# Patient Record
Sex: FEMALE | Race: WHITE | URBAN - NONMETROPOLITAN AREA
[De-identification: names, ages, dates, MRNs, and addresses within clinical notes are randomized per-mention and may not be internally consistent; named-entity substitution may affect disease eponyms.]

---

## 2021-02-15 ENCOUNTER — IMMUNIZATIONS (OUTPATIENT)
Dept: FAMILY MEDICINE CLINIC | Facility: HOSPITAL | Age: 86
End: 2021-02-15

## 2021-02-15 DIAGNOSIS — Z23 ENCOUNTER FOR IMMUNIZATION: Primary | ICD-10-CM

## 2021-02-15 PROCEDURE — 91301 SARS-COV-2 / COVID-19 MRNA VACCINE (MODERNA) 100 MCG: CPT

## 2021-02-15 PROCEDURE — 0011A SARS-COV-2 / COVID-19 MRNA VACCINE (MODERNA) 100 MCG: CPT

## 2021-03-15 ENCOUNTER — IMMUNIZATIONS (OUTPATIENT)
Dept: FAMILY MEDICINE CLINIC | Facility: HOSPITAL | Age: 86
End: 2021-03-15

## 2021-03-15 DIAGNOSIS — Z23 ENCOUNTER FOR IMMUNIZATION: Primary | ICD-10-CM

## 2021-03-15 PROCEDURE — 91301 SARS-COV-2 / COVID-19 MRNA VACCINE (MODERNA) 100 MCG: CPT

## 2021-03-15 PROCEDURE — 0012A SARS-COV-2 / COVID-19 MRNA VACCINE (MODERNA) 100 MCG: CPT

## 2023-04-17 ENCOUNTER — HOSPITAL ENCOUNTER (EMERGENCY)
Facility: HOSPITAL | Age: 88
Discharge: HOME | End: 2023-04-17
Attending: EMERGENCY MEDICINE | Admitting: EMERGENCY MEDICINE

## 2023-04-17 ENCOUNTER — APPOINTMENT (EMERGENCY)
Dept: CT IMAGING | Facility: HOSPITAL | Age: 88
End: 2023-04-17

## 2023-04-17 VITALS
BODY MASS INDEX: 35.05 KG/M2 | HEART RATE: 59 BPM | RESPIRATION RATE: 17 BRPM | OXYGEN SATURATION: 96 % | HEIGHT: 58 IN | WEIGHT: 167 LBS | SYSTOLIC BLOOD PRESSURE: 184 MMHG | DIASTOLIC BLOOD PRESSURE: 77 MMHG | TEMPERATURE: 98.1 F

## 2023-04-17 DIAGNOSIS — N39.0 UTI (URINARY TRACT INFECTION): ICD-10-CM

## 2023-04-17 DIAGNOSIS — R05.9 COUGH: Primary | ICD-10-CM

## 2023-04-17 LAB
ALBUMIN SERPL BCP-MCNC: 4.4 G/DL (ref 3.5–5)
ALP SERPL-CCNC: 85 U/L (ref 34–104)
ALT SERPL W P-5'-P-CCNC: 13 U/L (ref 7–52)
ANION GAP SERPL CALCULATED.3IONS-SCNC: 9 MMOL/L (ref 4–13)
AST SERPL W P-5'-P-CCNC: 20 U/L (ref 13–39)
BACTERIA UR QL AUTO: ABNORMAL /HPF
BASOPHILS # BLD AUTO: 0.03 THOUSANDS/ΜL (ref 0–0.1)
BASOPHILS NFR BLD AUTO: 0 % (ref 0–1)
BILIRUB SERPL-MCNC: 0.33 MG/DL (ref 0.2–1)
BILIRUB UR QL STRIP: NEGATIVE
BNP SERPL-MCNC: 181 PG/ML (ref 0–100)
BUN SERPL-MCNC: 34 MG/DL (ref 5–25)
CALCIUM SERPL-MCNC: 9.8 MG/DL (ref 8.4–10.2)
CARDIAC TROPONIN I PNL SERPL HS: 18 NG/L
CHLORIDE SERPL-SCNC: 104 MMOL/L (ref 96–108)
CLARITY UR: CLEAR
CO2 SERPL-SCNC: 27 MMOL/L (ref 21–32)
COLOR UR: YELLOW
CREAT SERPL-MCNC: 1.58 MG/DL (ref 0.6–1.3)
EOSINOPHIL # BLD AUTO: 0.4 THOUSAND/ΜL (ref 0–0.61)
EOSINOPHIL NFR BLD AUTO: 5 % (ref 0–6)
ERYTHROCYTE [DISTWIDTH] IN BLOOD BY AUTOMATED COUNT: 14.9 % (ref 11.6–15.1)
FLUAV RNA RESP QL NAA+PROBE: NEGATIVE
FLUBV RNA RESP QL NAA+PROBE: NEGATIVE
GFR SERPL CREATININE-BSD FRML MDRD: 28 ML/MIN/1.73SQ M
GLUCOSE SERPL-MCNC: 145 MG/DL (ref 65–140)
GLUCOSE UR STRIP-MCNC: NEGATIVE MG/DL
HCT VFR BLD AUTO: 40.3 % (ref 34.8–46.1)
HGB BLD-MCNC: 12.6 G/DL (ref 11.5–15.4)
HGB UR QL STRIP.AUTO: NEGATIVE
IMM GRANULOCYTES # BLD AUTO: 0.06 THOUSAND/UL (ref 0–0.2)
IMM GRANULOCYTES NFR BLD AUTO: 1 % (ref 0–2)
KETONES UR STRIP-MCNC: NEGATIVE MG/DL
LEUKOCYTE ESTERASE UR QL STRIP: ABNORMAL
LYMPHOCYTES # BLD AUTO: 1.75 THOUSANDS/ΜL (ref 0.6–4.47)
LYMPHOCYTES NFR BLD AUTO: 22 % (ref 14–44)
MAGNESIUM SERPL-MCNC: 2.3 MG/DL (ref 1.9–2.7)
MCH RBC QN AUTO: 28.6 PG (ref 26.8–34.3)
MCHC RBC AUTO-ENTMCNC: 31.3 G/DL (ref 31.4–37.4)
MCV RBC AUTO: 92 FL (ref 82–98)
MONOCYTES # BLD AUTO: 0.65 THOUSAND/ΜL (ref 0.17–1.22)
MONOCYTES NFR BLD AUTO: 8 % (ref 4–12)
NEUTROPHILS # BLD AUTO: 5.05 THOUSANDS/ΜL (ref 1.85–7.62)
NEUTS SEG NFR BLD AUTO: 64 % (ref 43–75)
NITRITE UR QL STRIP: NEGATIVE
NON-SQ EPI CELLS URNS QL MICRO: ABNORMAL /HPF
NRBC BLD AUTO-RTO: 0 /100 WBCS
PH UR STRIP.AUTO: 5.5 [PH]
PLATELET # BLD AUTO: 231 THOUSANDS/UL (ref 149–390)
PMV BLD AUTO: 10.2 FL (ref 8.9–12.7)
POTASSIUM SERPL-SCNC: 3.9 MMOL/L (ref 3.5–5.3)
PROT SERPL-MCNC: 7 G/DL (ref 6.4–8.4)
PROT UR STRIP-MCNC: NEGATIVE MG/DL
RBC # BLD AUTO: 4.4 MILLION/UL (ref 3.81–5.12)
RBC #/AREA URNS AUTO: ABNORMAL /HPF
RSV RNA RESP QL NAA+PROBE: NEGATIVE
SARS-COV-2 RNA RESP QL NAA+PROBE: NEGATIVE
SODIUM SERPL-SCNC: 140 MMOL/L (ref 135–147)
SP GR UR STRIP.AUTO: 1.02 (ref 1–1.03)
UROBILINOGEN UR QL STRIP.AUTO: 0.2 E.U./DL
WBC # BLD AUTO: 7.94 THOUSAND/UL (ref 4.31–10.16)
WBC #/AREA URNS AUTO: ABNORMAL /HPF

## 2023-04-17 RX ORDER — AMLODIPINE BESYLATE 10 MG/1
10 TABLET ORAL DAILY
COMMUNITY
Start: 2023-04-14

## 2023-04-17 RX ORDER — ESOMEPRAZOLE MAGNESIUM 40 MG/1
40 CAPSULE, DELAYED RELEASE ORAL
COMMUNITY
Start: 2022-11-16

## 2023-04-17 RX ORDER — ERGOCALCIFEROL 1.25 MG/1
CAPSULE ORAL
COMMUNITY
Start: 2023-03-24

## 2023-04-17 RX ORDER — SERTRALINE HYDROCHLORIDE 100 MG/1
1 TABLET, FILM COATED ORAL DAILY
COMMUNITY
Start: 2023-03-24

## 2023-04-17 RX ORDER — METOPROLOL SUCCINATE 100 MG/1
100 TABLET, EXTENDED RELEASE ORAL DAILY
COMMUNITY
Start: 2022-12-12

## 2023-04-17 RX ORDER — CLOPIDOGREL BISULFATE 75 MG/1
1 TABLET ORAL DAILY
COMMUNITY
Start: 2023-03-06

## 2023-04-17 RX ORDER — ROSUVASTATIN CALCIUM 10 MG/1
10 TABLET, COATED ORAL DAILY
COMMUNITY
Start: 2022-09-30 | End: 2023-09-30

## 2023-04-17 RX ORDER — CEPHALEXIN 500 MG/1
500 CAPSULE ORAL EVERY 12 HOURS SCHEDULED
Qty: 10 CAPSULE | Refills: 0 | Status: SHIPPED | OUTPATIENT
Start: 2023-04-17 | End: 2023-04-22

## 2023-04-17 RX ORDER — FUROSEMIDE 20 MG/1
20 TABLET ORAL DAILY
COMMUNITY
Start: 2023-04-01 | End: 2024-03-31

## 2023-04-17 RX ORDER — MECLIZINE HYDROCHLORIDE 25 MG/1
25 TABLET ORAL 3 TIMES DAILY PRN
COMMUNITY
Start: 2022-09-30 | End: 2023-09-30

## 2023-04-17 RX ORDER — LEVOTHYROXINE SODIUM 0.05 MG/1
50 TABLET ORAL DAILY
COMMUNITY
Start: 2022-12-27

## 2023-04-17 NOTE — ED PROVIDER NOTES
History  Chief Complaint   Patient presents with   • Cough     Cough ever since pt had Pneumonia in the beginning of April  Pt reports feeling short of breath      Patient admitted April 4 at [facility] for pneumonia, eventually discharged on supplemental oxygen, completed stay at inpatient rehab, discharged home from there still with home oxygen, today with continued coughing and shortness of breath  Daughter states that home health nurse was concerned about patient's quick desaturation when off oxygen  Apparently, patient removed her oxygen to ambulate to the restroom, use the restroom, ambulated back, then replaced her oxygen  Brought patient into the emergency room for evaluation  Daughter also states patient is having urinary frequency  No fevers or chills  No nausea or vomiting  No flank pain  History provided by:  Patient   used: No    Cough  Cough characteristics:  Non-productive  Sputum characteristics:  Nondescript  Severity:  Moderate  Onset quality:  Gradual  Timing:  Constant  Progression:  Unchanged  Chronicity:  New  Relieved by:  Nothing  Worsened by:  Nothing  Ineffective treatments:  None tried  Associated symptoms: shortness of breath    Associated symptoms: no chest pain, no chills, no diaphoresis, no ear pain, no eye discharge, no fever, no headaches, no rash, no sinus congestion, no sore throat and no wheezing        Prior to Admission Medications   Prescriptions Last Dose Informant Patient Reported? Taking?    amLODIPine (NORVASC) 10 mg tablet   Yes Yes   Sig: Take 10 mg by mouth daily   clopidogrel (PLAVIX) 75 mg tablet   Yes Yes   Sig: Take 1 tablet by mouth daily   ergocalciferol (ERGOCALCIFEROL) 1 25 MG (40470 UT) capsule   Yes Yes   Sig: TAKE 1 CAPSULE BY MOUTH ONE TIME PER WEEK   esomeprazole (NexIUM) 40 MG capsule   Yes Yes   Sig: Take 40 mg by mouth   furosemide (LASIX) 20 mg tablet   Yes Yes   Sig: Take 20 mg by mouth daily   levothyroxine 50 mcg tablet   Yes Yes   Sig: Take 50 mcg by mouth daily   meclizine (ANTIVERT) 25 mg tablet   Yes Yes   Sig: Take 25 mg by mouth Three times daily as needed   metoprolol succinate (TOPROL-XL) 100 mg 24 hr tablet   Yes Yes   Sig: Take 100 mg by mouth daily   rosuvastatin (CRESTOR) 10 MG tablet   Yes Yes   Sig: Take 10 mg by mouth daily   sertraline (ZOLOFT) 100 mg tablet   Yes Yes   Sig: Take 1 tablet by mouth daily      Facility-Administered Medications: None       Past Medical History:   Diagnosis Date   • [facility]    • Ambulatory dysfunction    • 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)        Past Surgical History:   Procedure Laterality Date   • CORONARY STENT PLACEMENT         History reviewed  No pertinent family history  I have reviewed and agree with the history as documented  E-Cigarette/Vaping     E-Cigarette/Vaping Substances     Social History     Tobacco Use   • Smoking status: Never   • Smokeless tobacco: Never   Substance Use Topics   • Alcohol use: Never   • Drug use: Never       Review of Systems   Constitutional: Negative for chills, diaphoresis and fever  HENT: Negative for ear pain, hearing loss, sore throat, trouble swallowing and voice change  Eyes: Negative for pain and discharge  Respiratory: Positive for cough and shortness of breath  Negative for wheezing  Cardiovascular: Negative for chest pain and palpitations  Gastrointestinal: Negative for abdominal pain, blood in stool, constipation, diarrhea, nausea and vomiting  Genitourinary: Negative for dysuria, flank pain, frequency and hematuria     Musculoskeletal: Negative for joint swelling, neck pain and neck stiffness  Skin: Negative for rash and wound  Neurological: Negative for dizziness, seizures, syncope, facial asymmetry and headaches  Psychiatric/Behavioral: Negative for hallucinations, self-injury and suicidal ideas  All other systems reviewed and are negative  Physical Exam  Physical Exam  Vitals and nursing note reviewed  Constitutional:       General: She is not in acute distress  Appearance: She is well-developed  HENT:      Head: Normocephalic and atraumatic  Right Ear: External ear normal       Left Ear: External ear normal    Eyes:      General: No scleral icterus  Right eye: No discharge  Left eye: No discharge  Extraocular Movements: Extraocular movements intact  Conjunctiva/sclera: Conjunctivae normal    Cardiovascular:      Rate and Rhythm: Normal rate and regular rhythm  Heart sounds: Normal heart sounds  No murmur heard  Pulmonary:      Effort: Pulmonary effort is normal       Breath sounds: Normal breath sounds  No wheezing or rales  Abdominal:      General: Bowel sounds are normal  There is no distension  Palpations: Abdomen is soft  Tenderness: There is no abdominal tenderness  There is no guarding or rebound  Musculoskeletal:         General: No deformity  Normal range of motion  Cervical back: Normal range of motion and neck supple  Skin:     General: Skin is warm and dry  Findings: No rash  Neurological:      General: No focal deficit present  Mental Status: She is alert and oriented to person, place, and time  Cranial Nerves: No cranial nerve deficit  Psychiatric:         Mood and Affect: Mood normal          Behavior: Behavior normal          Thought Content:  Thought content normal          Judgment: Judgment normal          Vital Signs  ED Triage Vitals [04/17/23 1856]   Temperature Pulse Respirations Blood Pressure SpO2   98 1 °F (36 7 °C) 63 16 169/67 96 %      Temp Source Heart Rate Source Patient Position - Orthostatic VS BP Location FiO2 (%)   Temporal Monitor Sitting Left arm --      Pain Score       No Pain           Vitals:    04/17/23 1856 04/17/23 1945   BP: 169/67 (!) 184/77   Pulse: 63 59   Patient Position - Orthostatic VS: Sitting Lying         Visual Acuity      ED Medications  Medications - No data to display    Diagnostic Studies  Results Reviewed     Procedure Component Value Units Date/Time    Urine culture [[id number]] Collected: 04/17/23 1958    Lab Status: In process Specimen: Urine, Clean Catch Updated: 04/17/23 2056    Blood culture #1 [[id number]] Collected: 04/17/23 2037    Lab Status: In process Specimen: Blood from Arm, Right Updated: 04/17/23 2039    Urine Microscopic [[id number]]  (Abnormal) Collected: 04/17/23 1958    Lab Status: Final result Specimen: Urine, Clean Catch Updated: 04/17/23 2015     RBC, UA None Seen /hpf      WBC, UA 4-10 /hpf      Epithelial Cells None Seen /hpf      Bacteria, UA None Seen /hpf     COVID19, Influenza A/B, RSV PCR, [facility] [[id number]]  (Normal) Collected: 04/17/23 1923    Lab Status: Final result Specimen: Nares from Nasopharyngeal Swab Updated: 04/17/23 2015     SARS-CoV-2 Negative     INFLUENZA A PCR Negative     INFLUENZA B PCR Negative     RSV PCR Negative    Narrative:      FOR PEDIATRIC PATIENTS - copy/paste COVID Guidelines URL to browser: [email or web address]    SARS-CoV-2 assay is a Nucleic Acid Amplification assay intended for the  qualitative detection of nucleic acid from SARS-CoV-2 in nasopharyngeal  swabs  Results are for the presumptive identification of SARS-CoV-2 RNA  Positive results are indicative of infection with SARS-CoV-2, the virus  causing COVID-19, but do not rule out bacterial infection or co-infection  with other viruses   Laboratories within the [place] and its  territories are required to report all positive results to the appropriate  public health authorities  Negative results do not preclude SARS-CoV-2  infection and should not be used as the sole basis for treatment or other  patient management decisions  Negative results must be combined with  clinical observations, patient history, and epidemiological information  This test has not been FDA cleared or approved  This test has been authorized by FDA under an Emergency Use Authorization  (EUA)  This test is only authorized for the duration of time the  declaration that circumstances exist justifying the authorization of the  emergency use of an in vitro diagnostic tests for detection of SARS-CoV-2  virus and/or diagnosis of COVID-19 infection under section 564(b)(1) of  the Act, 21 U  S C  498NTE-8(U)(9), unless the authorization is terminated  or revoked sooner  The test has been validated but independent review by FDA  and CLIA is pending  Test performed using [employer] Gene[employer]pert: This RT-PCR assay targets N2,  a region unique to SARS-CoV-2  A conserved region in the E-gene was chosen  for pan-Sarbecovirus detection which includes SARS-CoV-2  According to CMS-2020-01-R, this platform meets the definition of high-throughput technology      UA w Reflex to Microscopic w Reflex to Culture [[id number]]  (Abnormal) Collected: 04/17/23 1958    Lab Status: Final result Specimen: Urine, Clean Catch Updated: 04/17/23 2007     Color, UA Yellow     Clarity, UA Clear     Specific [place], UA 1 020     pH, UA 5 5     Leukocytes, UA Small     Nitrite, UA Negative     Protein, UA Negative mg/dl      Glucose, UA Negative mg/dl      Ketones, UA Negative mg/dl      Urobilinogen, UA 0 2 E U /dl      Bilirubin, UA Negative     Occult Blood, UA Negative    HS Troponin I 4hr [[id number]]     Lab Status: No result Specimen: Blood     B-Type Natriuretic Peptide(BNP) [[id number]]  (Abnormal) Collected: 04/17/23 1932    Lab Status: Final result Specimen: Blood from Arm, Right Updated: 04/17/23 2004      pg/mL     HS Troponin 0hr (reflex protocol) [[id number]]  (Normal) Collected: 04/17/23 1932    Lab Status: Final result Specimen: Blood from Arm, Right Updated: 04/17/23 2001     hs TnI 0hr 18 ng/L     HS Troponin I 2hr [[id number]]     Lab Status: No result Specimen: Blood     Comprehensive metabolic panel [[id number]]  (Abnormal) Collected: 04/17/23 1932    Lab Status: Final result Specimen: Blood from Arm, Right Updated: 04/17/23 1957     Sodium 140 mmol/L      Potassium 3 9 mmol/L      Chloride 104 mmol/L      CO2 27 mmol/L      ANION GAP 9 mmol/L      BUN 34 mg/dL      Creatinine 1 58 mg/dL      Glucose 145 mg/dL      Calcium 9 8 mg/dL      AST 20 U/L      ALT 13 U/L      Alkaline Phosphatase 85 U/L      Total Protein 7 0 g/dL      Albumin 4 4 g/dL      Total Bilirubin 0 33 mg/dL      eGFR 28 ml/min/1 73sq m     Narrative:      [facility] guidelines for Chronic Kidney Disease (CKD):   •  Stage 1 with normal or high GFR (GFR > 90 mL/min/1 73 square meters)  •  Stage 2 Mild CKD (GFR = 60-89 mL/min/1 73 square meters)  •  Stage 3A Moderate CKD (GFR = 45-59 mL/min/1 73 square meters)  •  Stage 3B Moderate CKD (GFR = 30-44 mL/min/1 73 square meters)  •  Stage 4 Severe CKD (GFR = 15-29 mL/min/1 73 square meters)  •  Stage 5 End Stage CKD (GFR <15 mL/min/1 73 square meters)  Note: GFR calculation is accurate only with a steady state creatinine    Magnesium [[id number]]  (Normal) Collected: 04/17/23 1932    Lab Status: Final result Specimen: Blood from Arm, Right Updated: 04/17/23 1957     Magnesium 2 3 mg/dL     CBC and differential [[id number]]  (Abnormal) Collected: 04/17/23 1932    Lab Status: Final result Specimen: Blood from Arm, Right Updated: 04/17/23 1939     WBC 7 94 Thousand/uL      RBC 4 40 Million/uL      Hemoglobin 12 6 g/dL      Hematocrit 40 3 %      MCV 92 fL      MCH 28 6 pg      MCHC 31 3 g/dL      RDW 14 9 %      MPV 10 2 fL      Platelets 781 Thousands/uL      nRBC 0 /100 WBCs Neutrophils Relative 64 %      Immat GRANS % 1 %      Lymphocytes Relative 22 %      Monocytes Relative 8 %      Eosinophils Relative 5 %      Basophils Relative 0 %      Neutrophils Absolute 5 05 Thousands/µL      Immature Grans Absolute 0 06 Thousand/uL      Lymphocytes Absolute 1 75 Thousands/µL      Monocytes Absolute 0 65 Thousand/µL      Eosinophils Absolute 0 40 Thousand/µL      Basophils Absolute 0 03 Thousands/µL     Blood culture #2 [[id number]] Collected: 04/17/23 1932    Lab Status: In process Specimen: Blood from Arm, Right Updated: 04/17/23 1936                 CT chest wo contrast   ED Interpretation by Kasey Arcos MD (04/17 2047)   No large infiltrates noted      Final Result by Marsha Lee DO (04/17 2115)      Scattered areas of subsegmental scarring and subsegmental atelectasis  No focal consolidation or suspicious mass               Workstation performed: [id number]                    Procedures  ECG 12 Lead Documentation Only    Date/Time: 4/17/2023 7:44 PM  Performed by: Kasey Arcos MD  Authorized by: Kasey Arcos MD     ECG reviewed by me, the ED Provider: yes    Patient location:  ED  Previous ECG:     Previous ECG:  Unavailable  Interpretation:     Interpretation: normal    Rate:     ECG rate:  58    ECG rate assessment: bradycardic    Rhythm:     Rhythm: sinus bradycardia    Ectopy:     Ectopy: none    QRS:     QRS axis:  Normal    QRS intervals:  Normal  Conduction:     Conduction: normal    ST segments:     ST segments:  Normal  T waves:     T waves: normal    Other findings:     Other findings: LVH               ED Course  ED Course as of 04/17/23 2206 Mon Apr 17, 2023 2047 Labs at patient baseline  Patient remained hemodynamically stable with normal saturations on 2 L nasal cannula which she uses at home    Questionable urinary tract infection will prescribe antibiotic  Stable for discharge at this time                               SBIRT 22yo+    Flowsheet Row Most Recent Value   Initial Alcohol Screen: US AUDIT-C     1  How often do you have a drink containing alcohol? 0 Filed at: 04/17/2023 1859   2  How many drinks containing alcohol do you have on a typical day you are drinking? 0 Filed at: 04/17/2023 1859   3a  Male UNDER 65: How often do you have five or more drinks on one occasion? 0 Filed at: 04/17/2023 1859   3b  FEMALE Any Age, or MALE 65+: How often do you have 4 or more drinks on one occassion? 0 Filed at: 04/17/2023 1859   Audit-C Score 0 Filed at: 04/17/2023 1859   [place]: How many times in the past year have you    Used an illegal drug or used a prescription medication for non-medical reasons? Never Filed at: 04/17/2023 1859                    Medical Decision Making  Based on the history and medical screening exam performed the differential diagnosis includes but is not limited to chronic lung disease, pneumonia, new oxygen requirement, urinary tract infection  Based on the work-up performed in the emergency room which includes physical examination, and which may include laboratory studies and imaging as warranted including advanced imaging such as CT scan or ultrasound, the differential diagnosis is narrowed to exclude limb or life-threatening process  The patient is stable for discharge  Patient's labs are at baseline  Her saturations remain normal on 2 L nasal cannula which she uses at home  Work-up in the emergency room reveals questionable urinary tract infection for which antibiotic treatment has been initiated  Otherwise, labs are at baseline and patient does not require admission at this time  Advised to continue home oxygen use  Outpatient follow-up information for pulmonology provided  Patient stable for discharge at this time  Amount and/or Complexity of Data Reviewed  External Data Reviewed: labs, radiology and notes  Labs: ordered  Decision-making details documented in ED Course  Details:  At patient baseline with questionable urinary tract infection  Radiology: ordered and independent interpretation performed  Details: CT chest without evidence of infiltrate per my interpretation  ECG/medicine tests: ordered and independent interpretation performed  Decision-making details documented in ED Course  Details: Sinus bradycardia rate 58, LVH      Risk  Prescription drug management  Disposition  Final diagnoses:   Cough   UTI (urinary tract infection)     Time reflects when diagnosis was documented in both MDM as applicable and the Disposition within this note     Time User Action Codes Description Comment    4/17/2023  8:49 PM Dayan Pandey Add [R05 9] Cough     4/17/2023  8:49 PM Cayetano Brown Add [N39 0] UTI (urinary tract infection)       ED Disposition     ED Disposition   Discharge    Condition   Stable    Date/Time   Mon Apr 17, 2023  8:49 PM    Comment   Desiree Gerard discharge to home/self care                 Follow-up Information     Follow up With Specialties Details Why [address], MD Internal Medicine   [address] [facility] [address]  [phone]      Michael Crawford MD Pulmonary Disease, Pulmonology, [place] [address]   [address] [place] [address]  [phone]            Discharge Medication List as of 4/17/2023  8:51 PM      START taking these medications    Details   cephalexin (KEFLEX) 500 mg capsule Take 1 capsule (500 mg total) by mouth every 12 (twelve) hours for 5 days, Starting Mon 4/17/2023, Until Sat 4/22/2023, Normal         CONTINUE these medications which have NOT CHANGED    Details   amLODIPine (NORVASC) 10 mg tablet Take 10 mg by mouth daily, Starting Fri 4/14/2023, Historical Med      clopidogrel (PLAVIX) 75 mg tablet Take 1 tablet by mouth daily, Starting Mon 3/6/2023, Historical Med      ergocalciferol (ERGOCALCIFEROL) 1 25 MG (92722 UT) capsule TAKE 1 CAPSULE BY MOUTH ONE TIME PER WEEK, Historical Med esomeprazole (NexIUM) 40 MG capsule Take 40 mg by mouth, Starting Wed 11/16/2022, Historical Med      furosemide (LASIX) 20 mg tablet Take 20 mg by mouth daily, Starting Sat 4/1/2023, Until Sun 3/31/2024, Historical Med      levothyroxine 50 mcg tablet Take 50 mcg by mouth daily, Starting Tue 12/27/2022, Historical Med      meclizine (ANTIVERT) 25 mg tablet Take 25 mg by mouth Three times daily as needed, Starting Fri 9/30/2022, Until Sat 9/30/2023 at 2359, Historical Med      metoprolol succinate (TOPROL-XL) 100 mg 24 hr tablet Take 100 mg by mouth daily, Starting Mon 12/12/2022, Historical Med      rosuvastatin (CRESTOR) 10 MG tablet Take 10 mg by mouth daily, Starting Fri 9/30/2022, Until Sat 9/30/2023, Historical Med      sertraline (ZOLOFT) 100 mg tablet Take 1 tablet by mouth daily, Starting Fri 3/24/2023, Historical Med             No discharge procedures on file      PDMP Review     None          ED Provider  Electronically Signed by           Baylee gAuero MD  04/17/23 5171

## 2023-04-18 LAB
ATRIAL RATE: 58 BPM
P AXIS: 38 DEGREES
PR INTERVAL: 170 MS
QRS AXIS: -32 DEGREES
QRSD INTERVAL: 90 MS
QT INTERVAL: 444 MS
QTC INTERVAL: 435 MS
T WAVE AXIS: 105 DEGREES
VENTRICULAR RATE: 58 BPM

## 2023-04-18 NOTE — DISCHARGE INSTRUCTIONS
Please continue to use the home oxygen  Please keep all upcoming appointments with your primary care physician  Please begin taking the prescribed antibiotic for treatment of urinary tract infection  Please follow-up with the pulmonary doctor of your choice or the pulmonary doctor listed below

## 2023-04-19 LAB — BACTERIA UR CULT: NORMAL

## 2023-04-23 LAB
BACTERIA BLD CULT: NORMAL
BACTERIA BLD CULT: NORMAL

## 2024-11-23 ENCOUNTER — APPOINTMENT (EMERGENCY)
Dept: RADIOLOGY | Facility: HOSPITAL | Age: 89
End: 2024-11-23
Payer: COMMERCIAL

## 2024-11-23 ENCOUNTER — HOSPITAL ENCOUNTER (EMERGENCY)
Facility: HOSPITAL | Age: 89
Discharge: ANOTHER HOSPITAL | End: 2024-11-23
Attending: STUDENT IN AN ORGANIZED HEALTH CARE EDUCATION/TRAINING PROGRAM
Payer: COMMERCIAL

## 2024-11-23 ENCOUNTER — APPOINTMENT (EMERGENCY)
Dept: CT IMAGING | Facility: HOSPITAL | Age: 89
End: 2024-11-23
Payer: COMMERCIAL

## 2024-11-23 VITALS
WEIGHT: 161.82 LBS | DIASTOLIC BLOOD PRESSURE: 65 MMHG | BODY MASS INDEX: 33.82 KG/M2 | RESPIRATION RATE: 34 BRPM | HEART RATE: 68 BPM | OXYGEN SATURATION: 95 % | TEMPERATURE: 98 F | SYSTOLIC BLOOD PRESSURE: 144 MMHG

## 2024-11-23 DIAGNOSIS — S06.5XAA SUBDURAL HEMATOMA (HCC): ICD-10-CM

## 2024-11-23 DIAGNOSIS — M25.551 RIGHT HIP PAIN: ICD-10-CM
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
## 2024-11-23 LAB
ABO GROUP BLD: NORMAL
ALBUMIN SERPL BCG-MCNC: 3.7 G/DL (ref 3.5–5)
ALP SERPL-CCNC: 56 U/L (ref 34–104)
ALT SERPL W P-5'-P-CCNC: 7 U/L (ref 7–52)
ANION GAP SERPL CALCULATED.3IONS-SCNC: 7 MMOL/L (ref 4–13)
AST SERPL W P-5'-P-CCNC: 13 U/L (ref 13–39)
BASOPHILS # BLD AUTO: 0.01 THOUSANDS/ÂΜL (ref 0–0.1)
BASOPHILS NFR BLD AUTO: 0 % (ref 0–1)
BILIRUB SERPL-MCNC: 0.38 MG/DL (ref 0.2–1)
BILIRUB UR QL STRIP: NEGATIVE
BLD GP AB SCN SERPL QL: NEGATIVE
BUN SERPL-MCNC: 20 MG/DL (ref 5–25)
CALCIUM SERPL-MCNC: 9.1 MG/DL (ref 8.4–10.2)
CHLORIDE SERPL-SCNC: 106 MMOL/L (ref 96–108)
CLARITY UR: CLEAR
CO2 SERPL-SCNC: 25 MMOL/L (ref 21–32)
COLOR UR: YELLOW
CREAT SERPL-MCNC: 1.15 MG/DL (ref 0.6–1.3)
EOSINOPHIL # BLD AUTO: 0.27 THOUSAND/ÂΜL (ref 0–0.61)
EOSINOPHIL NFR BLD AUTO: 4 % (ref 0–6)
ERYTHROCYTE [DISTWIDTH] IN BLOOD BY AUTOMATED COUNT: 14.4 % (ref 11.6–15.1)
GFR SERPL CREATININE-BSD FRML MDRD: 40 ML/MIN/1.73SQ M
GLUCOSE SERPL-MCNC: 102 MG/DL (ref 65–140)
GLUCOSE UR STRIP-MCNC: NEGATIVE MG/DL
HCT VFR BLD AUTO: 34.7 % (ref 34.8–46.1)
HGB BLD-MCNC: 10.7 G/DL (ref 11.5–15.4)
HGB UR QL STRIP.AUTO: NEGATIVE
IMM GRANULOCYTES # BLD AUTO: 0.07 THOUSAND/UL (ref 0–0.2)
IMM GRANULOCYTES NFR BLD AUTO: 1 % (ref 0–2)
KETONES UR STRIP-MCNC: NEGATIVE MG/DL
LEUKOCYTE ESTERASE UR QL STRIP: NEGATIVE
LYMPHOCYTES # BLD AUTO: 0.93 THOUSANDS/ÂΜL (ref 0.6–4.47)
LYMPHOCYTES NFR BLD AUTO: 14 % (ref 14–44)
MCH RBC QN AUTO: 29.2 PG (ref 26.8–34.3)
MCHC RBC AUTO-ENTMCNC: 30.8 G/DL (ref 31.4–37.4)
MCV RBC AUTO: 95 FL (ref 82–98)
MONOCYTES # BLD AUTO: 0.6 THOUSAND/ÂΜL (ref 0.17–1.22)
MONOCYTES NFR BLD AUTO: 9 % (ref 4–12)
NEUTROPHILS # BLD AUTO: 4.77 THOUSANDS/ÂΜL (ref 1.85–7.62)
NEUTS SEG NFR BLD AUTO: 72 % (ref 43–75)
NITRITE UR QL STRIP: NEGATIVE
NRBC BLD AUTO-RTO: 0 /100 WBCS
PH UR STRIP.AUTO: 6 [PH]
PLATELET # BLD AUTO: 153 THOUSANDS/UL (ref 149–390)
PMV BLD AUTO: 10.7 FL (ref 8.9–12.7)
POTASSIUM SERPL-SCNC: 4.2 MMOL/L (ref 3.5–5.3)
PROT SERPL-MCNC: 5.8 G/DL (ref 6.4–8.4)
PROT UR STRIP-MCNC: NEGATIVE MG/DL
RBC # BLD AUTO: 3.66 MILLION/UL (ref 3.81–5.12)
RH BLD: POSITIVE
SODIUM SERPL-SCNC: 138 MMOL/L (ref 135–147)
SP GR UR STRIP.AUTO: 1.01 (ref 1–1.03)
SPECIMEN EXPIRATION DATE: NORMAL
UROBILINOGEN UR QL STRIP.AUTO: 0.2 E.U./DL
WBC # BLD AUTO: 6.65 THOUSAND/UL (ref 4.31–10.16)

## 2024-11-23 PROCEDURE — 36415 COLL VENOUS BLD VENIPUNCTURE: CPT | Performed by: PHYSICIAN ASSISTANT

## 2024-11-23 PROCEDURE — 74177 CT ABD & PELVIS W/CONTRAST: CPT

## 2024-11-23 PROCEDURE — 96361 HYDRATE IV INFUSION ADD-ON: CPT

## 2024-11-23 PROCEDURE — 96365 THER/PROPH/DIAG IV INF INIT: CPT

## 2024-11-23 PROCEDURE — 72125 CT NECK SPINE W/O DYE: CPT

## 2024-11-23 PROCEDURE — 86900 BLOOD TYPING SEROLOGIC ABO: CPT | Performed by: PHYSICIAN ASSISTANT

## 2024-11-23 PROCEDURE — 81003 URINALYSIS AUTO W/O SCOPE: CPT | Performed by: PHYSICIAN ASSISTANT

## 2024-11-23 PROCEDURE — 93005 ELECTROCARDIOGRAM TRACING: CPT

## 2024-11-23 PROCEDURE — 99285 EMERGENCY DEPT VISIT HI MDM: CPT | Performed by: PHYSICIAN ASSISTANT

## 2024-11-23 PROCEDURE — 86901 BLOOD TYPING SEROLOGIC RH(D): CPT | Performed by: PHYSICIAN ASSISTANT

## 2024-11-23 PROCEDURE — 71260 CT THORAX DX C+: CPT

## 2024-11-23 PROCEDURE — 70450 CT HEAD/BRAIN W/O DYE: CPT

## 2024-11-23 PROCEDURE — 86850 RBC ANTIBODY SCREEN: CPT | Performed by: PHYSICIAN ASSISTANT

## 2024-11-23 PROCEDURE — 99285 EMERGENCY DEPT VISIT HI MDM: CPT

## 2024-11-23 PROCEDURE — 72170 X-RAY EXAM OF PELVIS: CPT

## 2024-11-23 PROCEDURE — 73502 X-RAY EXAM HIP UNI 2-3 VIEWS: CPT

## 2024-11-23 PROCEDURE — 80053 COMPREHEN METABOLIC PANEL: CPT | Performed by: PHYSICIAN ASSISTANT

## 2024-11-23 PROCEDURE — 71045 X-RAY EXAM CHEST 1 VIEW: CPT

## 2024-11-23 PROCEDURE — 85025 COMPLETE CBC W/AUTO DIFF WBC: CPT | Performed by: PHYSICIAN ASSISTANT

## 2024-11-23 RX ORDER — MORPHINE SULFATE 4 MG/ML
4 INJECTION, SOLUTION INTRAMUSCULAR; INTRAVENOUS ONCE
Status: DISCONTINUED | OUTPATIENT
Start: 2024-11-23 | End: 2024-11-23

## 2024-11-23 RX ORDER — OLANZAPINE 2.5 MG/1
5 TABLET, FILM COATED ORAL ONCE
Status: COMPLETED | OUTPATIENT
Start: 2024-11-23 | End: 2024-11-23

## 2024-11-23 RX ADMIN — IOHEXOL 100 ML: 350 INJECTION, SOLUTION INTRAVENOUS at 12:06

## 2024-11-23 RX ADMIN — SODIUM CHLORIDE 500 ML: 0.9 INJECTION, SOLUTION INTRAVENOUS at 11:53

## 2024-11-23 RX ADMIN — DESMOPRESSIN ACETATE 29.2 MCG: 4 SOLUTION INTRAVENOUS at 13:42

## 2024-11-23 RX ADMIN — OLANZAPINE 5 MG: 2.5 TABLET, FILM COATED ORAL at 18:08

## 2024-11-23 NOTE — ED NOTES
Provider reached out to PACS to upgrade patient d/t mental status change      Lissette Joe, TODD  11/23/24 3692

## 2024-11-23 NOTE — EMTALA/ACUTE CARE TRANSFER
[facility] EMERGENCY DEPARTMENT  [address] [facility] [address] [facility] [address]  Dept: [phone]      EMTALA TRANSFER CONSENT    NAME Kinjal Flores                                         10/3/1929                              MRN [id number]    I have been informed of my rights regarding examination, treatment, and transfer   by Dr. Brenton Gabriel,     Benefits: Specialized equipment and/or services available at the receiving facility (Include comment)________________________, Continuity of care    Risks: Potential for delay in receiving treatment, Potential deterioration of medical condition, Loss of IV      Consent for Transfer:  I acknowledge that my medical condition has been evaluated and explained to me by the emergency department physician or other qualified medical person and/or my attending physician, who has recommended that I be transferred to the service of  Accepting Physician: Dr. Howe at Accepting Facility Name, City & State : [facility]. The above potential benefits of such transfer, the potential risks associated with such transfer, and the probable risks of not being transferred have been explained to me, and I fully understand them.  The doctor has explained that, in my case, the benefits of transfer outweigh the risks.  I agree to be transferred.    I authorize the performance of emergency medical procedures and treatments upon me in both transit and upon arrival at the receiving facility.  Additionally, I authorize the release of any and all medical records to the receiving facility and request they be transported with me, if possible.  I understand that the safest mode of transportation during a medical emergency is an ambulance and that the Hospital advocates the use of this mode of transport. Risks of traveling to the receiving facility by car, including absence of medical control, life sustaining equipment, such as oxygen, and medical personnel has been explained to  me and I fully understand them.    (ARMIDA CORRECT BOX BELOW)  [  ]  I consent to the stated transfer and to be transported by ambulance/helicopter.  [  ]  I consent to the stated transfer, but refuse transportation by ambulance and accept full responsibility for my transportation by car.  I understand the risks of non-ambulance transfers and I exonerate the Hospital and its staff from any deterioration in my condition that results from this refusal.    X___________________________________________    DATE  24  TIME________  Signature of patient or legally responsible individual signing on patient behalf           RELATIONSHIP TO PATIENT_________________________          Provider Certification    NAME Kinjal Flores                                         10/3/1929                              MRN [id number]    A medical screening exam was performed on the above named patient.  Based on the examination:    Condition Necessitating Transfer The primary encounter diagnosis was Fall, initial encounter. Diagnoses of Right hip pain and Subdural hematoma (HCC) were also pertinent to this visit.    Patient Condition: The patient has been stabilized such that within reasonable medical probability, no material deterioration of the patient condition or the condition of the unborn child(angella) is likely to result from the transfer    Reason for Transfer: Level of Care needed not available at this facility    Transfer Requirements: Facility [facility]   Space available and qualified personnel available for treatment as acknowledged by    Agreed to accept transfer and to provide appropriate medical treatment as acknowledged by       Dr. Howe  Appropriate medical records of the examination and treatment of the patient are provided at the time of transfer   STAFF INITIAL WHEN COMPLETED _______  Transfer will be performed by qualified personnel from    and appropriate transfer equipment as required, including the use of  necessary and appropriate life support measures.    Provider Certification: I have examined the patient and explained the following risks and benefits of being transferred/refusing transfer to the patient/family:  General risk, such as traffic hazards, adverse weather conditions, rough terrain or turbulence, possible failure of equipment (including vehicle or aircraft), or consequences of actions of persons outside the control of the transport personnel, Unanticipated needs of medical equipment and personnel during transport, Risk of worsening condition      Based on these reasonable risks and benefits to the patient and/or the unborn child(angella), and based upon the information available at the time of the patient’s examination, I certify that the medical benefits reasonably to be expected from the provision of appropriate medical treatments at another medical facility outweigh the increasing risks, if any, to the individual’s medical condition, and in the case of labor to the unborn child, from effecting the transfer.    X____________________________________________ DATE 11/23/24        TIME_______      ORIGINAL - SEND TO MEDICAL RECORDS   COPY - SEND WITH PATIENT DURING TRANSFER

## 2024-11-23 NOTE — ED NOTES
Brief & linens changed, patient incontinent of urine. Patient continues to be moving in bed causing brief to move out of place and wet linens.      Lissette Joe RN  11/23/24 5940

## 2024-11-23 NOTE — TRAUMA DOCUMENTATION
Family requesting sedatives, patient pulling BP cuff, cardiac leads and cursing at staff. Provider aware of same.

## 2024-11-23 NOTE — ED NOTES
Family now discussing wants of a transfer to [facility]. Provider aware of same, to discuss with family at bedside.      Lissette Joe RN  11/23/24 1564

## 2024-11-23 NOTE — EMTALA/ACUTE CARE TRANSFER
[facility] EMERGENCY DEPARTMENT  [address] [address]  Dept: [phone]      EMTALA TRANSFER CONSENT    NAME Kinjal Flores                                         10/3/1929                              MRN [id number]    I have been informed of my rights regarding examination, treatment, and transfer   by Dr. Brenton Gabriel DO    Benefits: Specialized equipment and/or services available at the receiving facility (Include comment)________________________    Risks: Potential for delay in receiving treatment, Potential deterioration of medical condition, Increased discomfort during transfer, Loss of IV      Consent for Transfer:  I acknowledge that my medical condition has been evaluated and explained to me by the emergency department physician or other qualified medical person and/or my attending physician, who has recommended that I be transferred to the service of  Accepting Physician: Dr. Vásquez at Accepting Facility Name, City & State : \[facility]". The above potential benefits of such transfer, the potential risks associated with such transfer, and the probable risks of not being transferred have been explained to me, and I fully understand them.  The doctor has explained that, in my case, the benefits of transfer outweigh the risks.  I agree to be transferred.    I authorize the performance of emergency medical procedures and treatments upon me in both transit and upon arrival at the receiving facility.  Additionally, I authorize the release of any and all medical records to the receiving facility and request they be transported with me, if possible.  I understand that the safest mode of transportation during a medical emergency is an ambulance and that the Hospital advocates the use of this mode of transport. Risks of traveling to the receiving facility by car, including absence of medical control, life sustaining equipment, such as oxygen, and medical personnel has been  explained to me and I fully understand them.    (ARMIDA CORRECT BOX BELOW)  [  ]  I consent to the stated transfer and to be transported by ambulance/helicopter.  [  ]  I consent to the stated transfer, but refuse transportation by ambulance and accept full responsibility for my transportation by car.  I understand the risks of non-ambulance transfers and I exonerate the Hospital and its staff from any deterioration in my condition that results from this refusal.    X___________________________________________    DATE  24  TIME________  Signature of patient or legally responsible individual signing on patient behalf           RELATIONSHIP TO PATIENT_________________________          Provider Certification    NAME Kinjal Flores                                         10/3/1929                              MRN [id number]    A medical screening exam was performed on the above named patient.  Based on the examination:    Condition Necessitating Transfer The primary encounter diagnosis was Fall, initial encounter. Diagnoses of Right hip pain and Subdural hematoma (HCC) were also pertinent to this visit.    Patient Condition: The patient has been stabilized such that within reasonable medical probability, no material deterioration of the patient condition or the condition of the unborn child(angella) is likely to result from the transfer    Reason for Transfer: Level of Care needed not available at this facility    Transfer Requirements: Facility [facility]   Space available and qualified personnel available for treatment as acknowledged by    Agreed to accept transfer and to provide appropriate medical treatment as acknowledged by       Dr. Vásquez  Appropriate medical records of the examination and treatment of the patient are provided at the time of transfer   STAFF INITIAL WHEN COMPLETED _______  Transfer will be performed by qualified personnel from    and appropriate transfer equipment as required, including the use  of necessary and appropriate life support measures.    Provider Certification: I have examined the patient and explained the following risks and benefits of being transferred/refusing transfer to the patient/family:  General risk, such as traffic hazards, adverse weather conditions, rough terrain or turbulence, possible failure of equipment (including vehicle or aircraft), or consequences of actions of persons outside the control of the transport personnel, Unanticipated needs of medical equipment and personnel during transport, Risk of worsening condition, The possibility of a transport vehicle being unavailable      Based on these reasonable risks and benefits to the patient and/or the unborn child(angella), and based upon the information available at the time of the patient’s examination, I certify that the medical benefits reasonably to be expected from the provision of appropriate medical treatments at another medical facility outweigh the increasing risks, if any, to the individual’s medical condition, and in the case of labor to the unborn child, from effecting the transfer.    X____________________________________________ DATE 11/23/24        TIME_______      ORIGINAL - SEND TO MEDICAL RECORDS   COPY - SEND WITH PATIENT DURING TRANSFER

## 2024-11-23 NOTE — ED NOTES
Patient's family inquiring patient eating dinner, discussed with AGUILAR who recommends NPO at this current time, family upset with same.      Lissette Joe RN  11/23/24 7621

## 2024-11-23 NOTE — TRAUMA DOCUMENTATION
"AGUILAR at bedside discussing upgrade in transfer. Patient's son at bedside reporting to AGUILAR that the staff has not done anything for the patient, she's constantly wet & the patient is \"hanging off the bed\".  "

## 2024-11-23 NOTE — ED NOTES
Continues to be incontinent of urine. Brief and linens changed, patient repositioned. Becoming restless.       Lissette Joe RN  11/23/24 2665

## 2024-11-24 NOTE — ED PROVIDER NOTES
Emergency Department Trauma Note  Kinjal Flores 89 y.o. female MRN: [id number]  Unit/Bed#: ED TR 13/TR 13B Encounter: [id number]      Trauma Alert: Trauma Acuity: Trauma Evaluation  Model of Arrival:   via    Trauma Team: Current Providers  Attending Provider: Brenton Gabriel DO  Physician Assistant: Boston Monahan PA-C  Registered Nurse: Lissette Joe, RN  Registered Nurse: Roxy Monsivais RN  Consultants:     None      History of Present Illness     Chief Complaint:   Chief Complaint   Patient presents with    Fall     Confused at baseline, fall at home last night on asa witnessed by family. No LOC or HS, on ASA. Reporting R hip and R upper leg pain     HPI:  Kinjal Flores is a 89 y.o. female who presents with fall.  Mechanism:           The patient is a 89 year old female who presents to the emergency department by EMS for the concern of right hip and leg pain.  The patient has a history of dementia and is on chronic oxygen.  The patient typically walks with a walker and does live at home with her daughter.  Patient had an unwitnessed fall while in the kitchen last evening.  Family was nearby and heard the fall.  Patient seem to not hit her head and seems to have more injuries to her right buttocks hip area.  Was assisted to stand.  Was able to walk last evening.  This morning patient complained of severe pain and had noted bruising.  Patient was limited with walking due to pain therefore they did seek evaluation for her leg injury.  They did not believe she did hit her head.  Patient is typically confused at baseline and they did not note any changes in mental status prior to arrival. Is on aspirin 81 mg daily         Review of Systems   All other systems reviewed and are negative.      Historical Information     Immunizations:   Immunization History   Administered Date(s) Administered    COVID-19 MODERNA VACC 0.5 ML IM 02/15/2021, 03/15/2021, 12/09/2021       Past Medical History:    Diagnosis Date    Alzheimer disease (HCC)     Ambulatory dysfunction     Basal ganglia infarction (HCC)     CAD (coronary artery disease)     CHF (congestive heart failure) (HCC)     Chronic kidney disease     Coronary artery disease     Dementia (HCC)     Depression     Disease of thyroid gland     Fibromyalgia     GERD (gastroesophageal reflux disease)     High cholesterol     Hypertension     Pneumonia     Stroke (HCC)      History reviewed. No pertinent family history.  Past Surgical History:   Procedure Laterality Date    CORONARY STENT PLACEMENT       Social History     Tobacco Use    Smoking status: Never    Smokeless tobacco: Never   Substance Use Topics    Alcohol use: Never    Drug use: Never     E-Cigarette/Vaping     E-Cigarette/Vaping Substances       Family History: non-contributory    Meds/Allergies   Prior to Admission Medications   Prescriptions Last Dose Informant Patient Reported? Taking?   amLODIPine (NORVASC) 10 mg tablet   Yes No   Sig: Take 10 mg by mouth daily   clopidogrel (PLAVIX) 75 mg tablet   Yes No   Sig: Take 1 tablet by mouth daily   ergocalciferol (ERGOCALCIFEROL) 1.25 MG (60785 UT) capsule   Yes No   Sig: TAKE 1 CAPSULE BY MOUTH ONE TIME PER WEEK   esomeprazole (NexIUM) 40 MG capsule   Yes No   Sig: Take 40 mg by mouth   furosemide (LASIX) 20 mg tablet   Yes No   Sig: Take 20 mg by mouth daily   levothyroxine 50 mcg tablet   Yes No   Sig: Take 50 mcg by mouth daily   meclizine (ANTIVERT) 25 mg tablet   Yes No   Sig: Take 25 mg by mouth Three times daily as needed   metoprolol succinate (TOPROL-XL) 100 mg 24 hr tablet   Yes No   Sig: Take 100 mg by mouth daily   rosuvastatin (CRESTOR) 10 MG tablet   Yes No   Sig: Take 10 mg by mouth daily   sertraline (ZOLOFT) 100 mg tablet   Yes No   Sig: Take 1 tablet by mouth daily      Facility-Administered Medications: None       Allergies   Allergen Reactions    Bactrim [Sulfamethoxazole-Trimethoprim] Rash       PHYSICAL EXAM    PE limited  by: n/a    Objective   Vitals:   First set: Temperature: 98 °F (36.7 °C) (11/23/24 1125)  Pulse: 60 (11/23/24 1125)  Respirations: 19 (11/23/24 1125)  Blood Pressure: (!) 201/84 (11/23/24 1125)  SpO2: 91 % (11/23/24 1125)    Primary Survey:   (A) Airway: normal  (B) Breathing: normal  (C) Circulation: Pulses:   normal  (D) Disabliity:  GCS Total:  14, Eye Opening:   Spontaneous = 4, Motor Response: Obeys commands = 6, and Verbal Response:  Confused = 4  (E) Expose:  Completed    Secondary Survey: (Click on Physical Exam tab above)  Physical Exam  Vitals and nursing note reviewed.   Constitutional:       General: She is in acute distress.      Appearance: She is well-developed.   HENT:      Head: Normocephalic and atraumatic.   Eyes:      Extraocular Movements: Extraocular movements intact.      Pupils: Pupils are equal, round, and reactive to light.   Cardiovascular:      Rate and Rhythm: Normal rate and regular rhythm.      Heart sounds: No murmur heard.  Pulmonary:      Effort: Pulmonary effort is normal.      Breath sounds: Normal breath sounds.   Abdominal:      General: Bowel sounds are normal.      Palpations: Abdomen is soft. There is no mass.      Tenderness: There is no abdominal tenderness. There is no rebound.      Hernia: No hernia is present.   Musculoskeletal:         General: Tenderness present.      Cervical back: Normal range of motion and neck supple.        Legs:    Skin:     General: Skin is warm and dry.      Capillary Refill: Capillary refill takes less than 2 seconds.   Neurological:      Mental Status: She is alert. Mental status is at baseline. She is disoriented.      Coordination: Coordination normal.   Psychiatric:         Behavior: Behavior normal.         Cervical spine cleared by clinical criteria? No (imaging required)      Invasive Devices       Peripheral Intravenous Line  Duration             Peripheral IV 11/23/24 Left Antecubital <1 day                    Lab Results:   Results  Reviewed       Procedure Component Value Units Date/Time    UA w Reflex to Microscopic w Reflex to Culture [[id number]] Collected: 11/23/24 1318    Lab Status: Final result Specimen: Urine, Straight Cath Updated: 11/23/24 1323     Color, UA Yellow     Clarity, UA Clear     Specific Gravity, UA 1.010     pH, UA 6.0     Leukocytes, UA Negative     Nitrite, UA Negative     Protein, UA Negative mg/dl      Glucose, UA Negative mg/dl      Ketones, UA Negative mg/dl      Urobilinogen, UA 0.2 E.U./dl      Bilirubin, UA Negative     Occult Blood, UA Negative    Comprehensive metabolic panel [[id number]]  (Abnormal) Collected: 11/23/24 1242    Lab Status: Final result Specimen: Blood from Arm, Left Updated: 11/23/24 1305     Sodium 138 mmol/L      Potassium 4.2 mmol/L      Chloride 106 mmol/L      CO2 25 mmol/L      ANION GAP 7 mmol/L      BUN 20 mg/dL      Creatinine 1.15 mg/dL      Glucose 102 mg/dL      Calcium 9.1 mg/dL      AST 13 U/L      ALT 7 U/L      Alkaline Phosphatase 56 U/L      Total Protein 5.8 g/dL      Albumin 3.7 g/dL      Total Bilirubin 0.38 mg/dL      eGFR 40 ml/min/1.73sq m     Narrative:      National Kid[employer] Disease Foundation guidelines for Chronic Kidney Disease (CKD):     Stage 1 with normal or high GFR (GFR > 90 mL/min/1.73 square meters)    Stage 2 Mild CKD (GFR = 60-89 mL/min/1.73 square meters)    Stage 3A Moderate CKD (GFR = 45-59 mL/min/1.73 square meters)    Stage 3B Moderate CKD (GFR = 30-44 mL/min/1.73 square meters)    Stage 4 Severe CKD (GFR = 15-29 mL/min/1.73 square meters)    Stage 5 End Stage CKD (GFR <15 mL/min/1.73 square meters)  Note: GFR calculation is accurate only with a steady state creatinine    CBC and differential [[id number]]  (Abnormal) Collected: 11/23/24 1242    Lab Status: Final result Specimen: Blood from Arm, Left Updated: 11/23/24 1253     WBC 6.65 Thousand/uL      RBC 3.66 Million/uL      Hemoglobin 10.7 g/dL      Hematocrit 34.7 %      MCV 95 fL      MCH 29.2 pg       MCHC 30.8 g/dL      RDW 14.4 %      MPV 10.7 fL      Platelets 153 Thousands/uL      nRBC 0 /100 WBCs      Segmented % 72 %      Immature Grans % 1 %      Lymphocytes % 14 %      Monocytes % 9 %      Eosinophils Relative 4 %      Basophils Relative 0 %      Absolute Neutrophils 4.77 Thousands/µL      Absolute Immature Grans 0.07 Thousand/uL      Absolute Lymphocytes 0.93 Thousands/µL      Absolute Monocytes 0.60 Thousand/µL      Eosinophils Absolute 0.27 Thousand/µL      Basophils Absolute 0.01 Thousands/µL                    Imaging Studies:   Direct to CT: Yes  XR hip/pelv 2-3 vws right if performed   Final Result by Wilbur Paz MD (11/23 7546)      Lateral soft tissue swelling overlying the right hip is better demonstrated on previous radiograph and subsequent CT. No subjacent bony injury.      Otherwise, no acute osseous abnormalities.         Computerized Assisted Algorithm (CAA) may have been used to analyze all applicable images.            Workstation performed: [id number]         TRAUMA - CT chest abdomen pelvis w contrast   Final Result by Jackson Hyatt MD (11/23 2787)      CT chest:      Trace bibasilar lower lobe subsegmental atelectasis. No other evidence of acute thoracic process.      No fracture.      Chronic findings and negatives as above.      CT abdomen and pelvis:      No acute intraabdominopelvic process.      Right posterior lateral hip deep subcutaneous hematoma immediately overlying the gluteal aponeurotic fascia. Shea Kim lesion is not excluded. More diffuse right lateral hip and gluteal subcutaneous edema/contusion.      No fracture.      Chronic findings and negatives as above.      The study was marked in EPIC for immediate notification.         Workstation performed: [id number]         TRAUMA - CT head wo contrast   Final Result by Jackson Hyatt MD (11/23 0432)      Trace left parafalcine subdural hematoma with maximum thickness of 2 mm.      No  intraparenchymal hemorrhage.      No skull fracture.      Chronic microangiopathy.      I personally discussed this study with IFEANYI RIMMA JAMISON on 11/23/2024 at 12:31            Workstation performed: [id number]         TRAUMA - CT spine cervical wo contrast   Final Result by Jackson Hyatt MD (11/23 1303)   Addendum (preliminary) 1 of 1 by Jackson Hyatt MD (11/23 1305)   ADDENDUM:      Thyroid nodule measures less than 1.5 cm as seen on the contrast-enhanced    chest CT. As such, follow-up thyroid ultrasound is not indicated.      Final      No cervical spine fracture or traumatic malalignment.      Chronic findings and negatives as above.      I personally discussed this study with IFEANYI RIMMA JAMISON on 11/23/2024 at 12:31                  Workstation performed: [id number]         XR Trauma pelvis ap only 1 or 2 vw   Final Result by Wilbur Paz MD (11/23 1202)      Question soft tissue swelling at the lateral right hip without subjacent bony abnormalities. Correlate for ecchymosis and tenderness on exam.      Otherwise, no acute osseous abnormalities.         Computerized Assisted Algorithm (CAA) may have been used to analyze all applicable images.         Workstation performed: [id number]         XR Trauma chest portable   Final Result by Wilbur Paz MD (11/23 1200)      Buckling deformity of the left sixth rib is new from 2023, though otherwise age-indeterminate. If the patient has focal point tenderness, this could represent nondisplaced acute fracture. No pneumothorax, contusion, or hemothorax.      Basilar atelectasis and probable pulmonary vascular congestion.      The study was marked in EPIC for immediate notification.      Workstation performed: [id number]               Procedures  Procedures         ED Course  ED Course as of 11/23/24 2033   Sat Nov 23, 2024   1213 E-fast was performed but limited    1356 1:57 PM  Patient's family now wishing to change their  mind regarding location of transfer, will need to transfer to [facility]  Patient already accepted at [facility] per previous discussions we will cancel and start over           Medical Decision Making  The patient is a 89 year old female who presents to the emergency department by EMS for the concern of right hip and leg pain.  The patient has a history of dementia and is on chronic oxygen.  The patient typically walks with a walker and does live at home with her daughter.  Patient had an unwitnessed fall while in the kitchen last evening.  Family was nearby and heard the fall.  Patient seem to not hit her head and seems to have more injuries to her right buttocks hip area.  Was assisted to stand.  Was able to walk last evening.  This morning patient complained of severe pain and had noted bruising.  Patient was limited with walking due to pain therefore they did seek evaluation for her leg injury.  They did not believe she did hit her head.  Patient is typically confused at baseline and they did not note any changes in mental status prior to arrival. Is on aspirin 81 mg daily     Patient was found to have small left-sided subdural hematoma.  The patient is on aspirin 81 mg was given DDAVP for reversal.  The patient is on chronic oxygen.  Patient complained of right sided hip pelvic pain and noted to have hematoma on her right buttocks and hip.  There was no deformity of the right leg.  Patient was not found to have any acute fractures in her hip or pelvis.  Patient was found to have contusions.  Patient otherwise did not have any other traumatic injury seen.  Patient was initially accepted to [facility] after family discussion but then an hour later after the patient was already accepted under Dr. Vásquez . The patient family approached the nurse requested change to now go to the [facility].  Patient then again was accepted to [facility], under Dr. Howe.  Transport was arranged for ground.  " Patient remained stable however patient around 6 PM started to note worsening agitation.  Family stated that \"she has a history of sundowning and dementia and sometimes acts like this\".  Family also stated \"she acts like this sometimes when she is in the hospitals\".  My clinical concern in this was expressed to the family is that yes this could definitely be related to her dementia, sundowning or new surroundings, however with her new injury and left subdural hematoma, agitation and any neuro change they have to approach differently at this time.  Reached out to PACS, patient's transport was updated to air, patient was transported urgently by helicopter.    In the ER stay I did keep the patient n.p.o.  Family was unfortunately very un happy with this. Son in particular.  I did educate the son that this was due to the patient's condition and that she was being transferred to another hospital.  I was unsure if she would need any more testing or if she would need any intervention.  If she would need any further intervention, especially if this would be surgical or needing sedation by her eating or drinking this could affect her medical care which I did not wish to negatively impact.    Patient's son unfortunately was upset but patient was able to be urgently transferred due to agitation and change in mental status/ behavior  to her higher level care facility for further treatment and family was in agreement with transfer and treatment plan    The differential diagnosis included but was not limited to acute traumatic injury, intracranial hemorrhage, subdural hematoma, spine fracture, splenic injury/ laceration, liver injury, kidney contusion, pulmonary contusion, pneumothorax, hematoma, hemorrhage         Amount and/or Complexity of Data Reviewed  Independent Historian: caregiver  Labs: ordered. Decision-making details documented in ED Course.  Radiology: ordered and independent interpretation performed. " Decision-making details documented in ED Course.    Risk  Prescription drug management.                Disposition  [facility] Transfer: yes  Final diagnoses:   Fall, initial encounter   Right hip pain   Subdural hematoma (HCC)     Time reflects when diagnosis was documented in both MDM as applicable and the Disposition within this note       Time User Action Codes Description Comment    11/23/2024 11:38 AM Boston Monahan [W19.XXXA] Fall, initial encounter     11/23/2024 12:32 PM Boston Monahan [M25.551] Right hip pain     11/23/2024 12:32 PM Boston Monahan [S06.5XAA] Subdural hematoma (HCC)           ED Disposition       ED Disposition   Transfer to Another Facility-In Network    Condition   --    Date/Time   Sat Nov 23, 2024  2:11 PM    Comment   Kinjal Flores [place] [facility]             MD Documentation      Flowsheet Row Most Recent Value   Patient Condition The patient has been stabilized such that within reasonable medical probability, no material deterioration of the patient condition or the condition of the unborn child(angella) is likely to result from the transfer   Reason for Transfer Level of Care needed not available at this facility   Benefits of Transfer Specialized equipment and/or services available at the receiving facility (Include comment)________________________, Continuity of care   Risks of Transfer Potential for delay in receiving treatment, Potential deterioration of medical condition, Loss of IV   Accepting Physician Dr. Howe   Accepting Facility Name, [facility] Lori Monahan PA-C, [place]   Provider Certification General risk, such as traffic hazards, adverse weather conditions, rough terrain or turbulence, possible failure of equipment (including vehicle or aircraft), or consequences of actions of persons outside the control of the transport personnel, Unanticipated needs of medical equipment and personnel during transport, Risk of  worsening condition          RN Documentation      Flowsheet Row Most Recent Value   [place]  [facility] [place]          Follow-up Information    None       Discharge Medication List as of 11/23/2024  6:29 PM        CONTINUE these medications which have NOT CHANGED    Details   amLODIPine (NORVASC) 10 mg tablet Take 10 mg by mouth daily, Starting Fri 4/14/2023, Historical Med      clopidogrel (PLAVIX) 75 mg tablet Take 1 tablet by mouth daily, Starting Mon 3/6/2023, Historical Med      ergocalciferol (ERGOCALCIFEROL) 1.25 MG (08105 UT) capsule TAKE 1 CAPSULE BY MOUTH ONE TIME PER WEEK, Historical Med      esomeprazole (NexIUM) 40 MG capsule Take 40 mg by mouth, Starting Wed 11/16/2022, Historical Med      furosemide (LASIX) 20 mg tablet Take 20 mg by mouth daily, Starting Sat 4/1/2023, Until Sun 3/31/2024, Historical Med      levothyroxine 50 mcg tablet Take 50 mcg by mouth daily, Starting Tue 12/27/2022, Historical Med      meclizine (ANTIVERT) 25 mg tablet Take 25 mg by mouth Three times daily as needed, Starting Fri 9/30/2022, Until Sat 9/30/2023 at 2359, Historical Med      metoprolol succinate (TOPROL-XL) 100 mg 24 hr tablet Take 100 mg by mouth daily, Starting Mon 12/12/2022, Historical Med      rosuvastatin (CRESTOR) 10 MG tablet Take 10 mg by mouth daily, Starting Fri 9/30/2022, Until Sat 9/30/2023, Historical Med      sertraline (ZOLOFT) 100 mg tablet Take 1 tablet by mouth daily, Starting Fri 3/24/2023, Historical Med           No discharge procedures on file.    PDMP Review       None            ED Provider  Electronically Signed by           Boston Monahan PA-C  11/23/24 2033

## 2024-11-25 LAB
ATRIAL RATE: 58 BPM
P AXIS: 78 DEGREES
PR INTERVAL: 166 MS
QRS AXIS: -18 DEGREES
QRSD INTERVAL: 96 MS
QT INTERVAL: 442 MS
QTC INTERVAL: 433 MS
T WAVE AXIS: 8 DEGREES
VENTRICULAR RATE: 58 BPM